# Patient Record
Sex: MALE | Race: WHITE | NOT HISPANIC OR LATINO | URBAN - METROPOLITAN AREA
[De-identification: names, ages, dates, MRNs, and addresses within clinical notes are randomized per-mention and may not be internally consistent; named-entity substitution may affect disease eponyms.]

---

## 2023-07-18 ENCOUNTER — TELEPHONE (OUTPATIENT)
Dept: PRIMARY CARE | Facility: CLINIC | Age: 43
End: 2023-07-18
Payer: COMMERCIAL

## 2023-07-18 DIAGNOSIS — E78.5 DYSLIPIDEMIA: ICD-10-CM

## 2023-07-18 DIAGNOSIS — I10 HYPERTENSION, UNSPECIFIED TYPE: ICD-10-CM

## 2023-07-18 RX ORDER — VALSARTAN 40 MG/1
40 TABLET ORAL DAILY
Qty: 60 TABLET | Refills: 0 | Status: SHIPPED | OUTPATIENT
Start: 2023-07-18 | End: 2023-09-08 | Stop reason: SDUPTHER

## 2023-07-18 RX ORDER — VALSARTAN 40 MG/1
40 TABLET ORAL DAILY
COMMUNITY
End: 2023-07-18 | Stop reason: SDUPTHER

## 2023-07-18 RX ORDER — ATORVASTATIN CALCIUM 20 MG/1
20 TABLET, FILM COATED ORAL NIGHTLY
Qty: 60 TABLET | Refills: 0 | Status: SHIPPED | OUTPATIENT
Start: 2023-07-18 | End: 2023-09-08 | Stop reason: SDUPTHER

## 2023-07-18 RX ORDER — ATORVASTATIN CALCIUM 20 MG/1
20 TABLET, FILM COATED ORAL NIGHTLY
COMMUNITY
End: 2023-07-18 | Stop reason: SDUPTHER

## 2023-07-18 NOTE — TELEPHONE ENCOUNTER
Pt called in and stated that he would like to schedule with Francy and also need a RF because he is all out of medication. PT has an appt scheduled for Sept          RX: Valsartan 40 mg        RX: Atorvastatin calcium 20 mg              [facility]

## 2023-09-07 PROBLEM — J45.909 ASTHMA (HHS-HCC): Status: ACTIVE | Noted: 2023-09-07

## 2023-09-07 PROBLEM — J06.9 VIRAL UPPER RESPIRATORY INFECTION: Status: ACTIVE | Noted: 2023-09-07

## 2023-09-07 PROBLEM — J45.21 MILD INTERMITTENT ASTHMA WITH ACUTE EXACERBATION (HHS-HCC): Status: ACTIVE | Noted: 2023-09-07

## 2023-09-07 PROBLEM — N20.0 NEPHROLITHIASIS: Status: ACTIVE | Noted: 2023-09-07

## 2023-09-07 PROBLEM — R42 DIZZINESS: Status: ACTIVE | Noted: 2023-09-07

## 2023-09-07 PROBLEM — I10 HYPERTENSION: Status: ACTIVE | Noted: 2023-09-07

## 2023-09-07 PROBLEM — F41.9 ANXIETY: Status: ACTIVE | Noted: 2023-09-07

## 2023-09-07 PROBLEM — E78.5 DYSLIPIDEMIA: Status: ACTIVE | Noted: 2023-09-07

## 2023-09-07 PROBLEM — F41.8 DEPRESSION WITH ANXIETY: Status: ACTIVE | Noted: 2023-09-07

## 2023-09-07 PROBLEM — R07.89 ATYPICAL CHEST PAIN: Status: ACTIVE | Noted: 2023-09-07

## 2023-09-07 PROBLEM — M79.604 RIGHT LEG PAIN: Status: ACTIVE | Noted: 2023-09-07

## 2023-09-07 PROBLEM — E66.9 OBESITY: Status: ACTIVE | Noted: 2023-09-07

## 2023-09-07 PROBLEM — M51.369 LUMBAR DEGENERATIVE DISC DISEASE: Status: ACTIVE | Noted: 2023-09-07

## 2023-09-07 PROBLEM — F33.41 RECURRENT MAJOR DEPRESSIVE DISORDER, IN PARTIAL REMISSION (CMS-HCC): Status: ACTIVE | Noted: 2023-09-07

## 2023-09-07 PROBLEM — M54.31 SCIATICA OF RIGHT SIDE: Status: ACTIVE | Noted: 2023-09-07

## 2023-09-07 PROBLEM — M51.36 LUMBAR DEGENERATIVE DISC DISEASE: Status: ACTIVE | Noted: 2023-09-07

## 2023-09-07 PROBLEM — R50.9 FEVER: Status: ACTIVE | Noted: 2023-09-07

## 2023-09-07 PROBLEM — M25.551 ACUTE PAIN OF RIGHT HIP: Status: ACTIVE | Noted: 2023-09-07

## 2023-09-07 PROBLEM — J20.9 ACUTE BRONCHITIS WITH BRONCHOSPASM: Status: ACTIVE | Noted: 2023-09-07

## 2023-09-07 PROBLEM — K21.9 ESOPHAGEAL REFLUX: Status: ACTIVE | Noted: 2023-09-07

## 2023-09-07 PROBLEM — E86.0 DEHYDRATION: Status: ACTIVE | Noted: 2023-09-07

## 2023-09-07 PROBLEM — R53.83 FATIGUE: Status: ACTIVE | Noted: 2023-09-07

## 2023-09-07 PROBLEM — M54.50 LOW BACK PAIN: Status: ACTIVE | Noted: 2023-09-07

## 2023-09-07 PROBLEM — L40.9 PSORIASIS: Status: ACTIVE | Noted: 2023-09-07

## 2023-09-07 RX ORDER — ALBUTEROL SULFATE 90 UG/1
2 AEROSOL, METERED RESPIRATORY (INHALATION) EVERY 4 HOURS PRN
COMMUNITY
Start: 2020-07-20 | End: 2023-09-08 | Stop reason: SDUPTHER

## 2023-09-07 RX ORDER — CHLORHEXIDINE GLUCONATE ORAL RINSE 1.2 MG/ML
SOLUTION DENTAL
COMMUNITY
Start: 2023-04-03 | End: 2023-09-08

## 2023-09-07 RX ORDER — AMOXICILLIN 500 MG/1
CAPSULE ORAL
COMMUNITY
Start: 2023-03-31 | End: 2023-09-08

## 2023-09-07 RX ORDER — METHYLPREDNISOLONE 4 MG/1
TABLET ORAL
COMMUNITY
Start: 2023-03-31 | End: 2023-09-08

## 2023-09-07 RX ORDER — ACETAMINOPHEN AND CODEINE PHOSPHATE 300; 30 MG/1; MG/1
TABLET ORAL
COMMUNITY
Start: 2023-03-09 | End: 2023-09-08

## 2023-09-07 RX ORDER — ESCITALOPRAM OXALATE 5 MG/1
5 TABLET ORAL DAILY
COMMUNITY
End: 2023-09-08

## 2023-09-07 RX ORDER — OXYCODONE HYDROCHLORIDE 5 MG/1
CAPSULE ORAL
COMMUNITY
Start: 2023-03-20 | End: 2023-09-08

## 2023-09-07 RX ORDER — IBUPROFEN 600 MG/1
600 TABLET ORAL EVERY 6 HOURS PRN
COMMUNITY
Start: 2023-03-20 | End: 2023-09-08

## 2023-09-07 RX ORDER — PREDNISONE 10 MG/1
TABLET ORAL
COMMUNITY
Start: 2022-06-24 | End: 2023-09-08

## 2023-09-08 ENCOUNTER — OFFICE VISIT (OUTPATIENT)
Dept: PRIMARY CARE | Facility: CLINIC | Age: 43
End: 2023-09-08
Payer: COMMERCIAL

## 2023-09-08 VITALS
OXYGEN SATURATION: 99 % | HEIGHT: 67 IN | BODY MASS INDEX: 33.9 KG/M2 | DIASTOLIC BLOOD PRESSURE: 80 MMHG | HEART RATE: 99 BPM | WEIGHT: 216 LBS | SYSTOLIC BLOOD PRESSURE: 124 MMHG | RESPIRATION RATE: 18 BRPM

## 2023-09-08 DIAGNOSIS — F41.8 DEPRESSION WITH ANXIETY: Primary | ICD-10-CM

## 2023-09-08 DIAGNOSIS — E78.5 DYSLIPIDEMIA: ICD-10-CM

## 2023-09-08 DIAGNOSIS — I10 HYPERTENSION, UNSPECIFIED TYPE: ICD-10-CM

## 2023-09-08 DIAGNOSIS — E66.09 CLASS 1 OBESITY DUE TO EXCESS CALORIES WITH BODY MASS INDEX (BMI) OF 33.0 TO 33.9 IN ADULT, UNSPECIFIED WHETHER SERIOUS COMORBIDITY PRESENT: ICD-10-CM

## 2023-09-08 DIAGNOSIS — J45.909 UNCOMPLICATED ASTHMA, UNSPECIFIED ASTHMA SEVERITY, UNSPECIFIED WHETHER PERSISTENT (HHS-HCC): ICD-10-CM

## 2023-09-08 DIAGNOSIS — I10 PRIMARY HYPERTENSION: ICD-10-CM

## 2023-09-08 PROCEDURE — 3074F SYST BP LT 130 MM HG: CPT

## 2023-09-08 PROCEDURE — 1036F TOBACCO NON-USER: CPT

## 2023-09-08 PROCEDURE — 3079F DIAST BP 80-89 MM HG: CPT

## 2023-09-08 PROCEDURE — 99214 OFFICE O/P EST MOD 30 MIN: CPT

## 2023-09-08 PROCEDURE — 3008F BODY MASS INDEX DOCD: CPT

## 2023-09-08 RX ORDER — DICLOFENAC SODIUM 10 MG/G
4 GEL TOPICAL 4 TIMES DAILY
COMMUNITY
Start: 2023-07-18

## 2023-09-08 RX ORDER — ATORVASTATIN CALCIUM 20 MG/1
20 TABLET, FILM COATED ORAL NIGHTLY
Qty: 90 TABLET | Refills: 3 | Status: SHIPPED | OUTPATIENT
Start: 2023-09-08 | End: 2023-11-15 | Stop reason: SDUPTHER

## 2023-09-08 RX ORDER — VALSARTAN 40 MG/1
40 TABLET ORAL DAILY
Qty: 90 TABLET | Refills: 3 | Status: SHIPPED | OUTPATIENT
Start: 2023-09-08

## 2023-09-08 RX ORDER — ALBUTEROL SULFATE 90 UG/1
2 AEROSOL, METERED RESPIRATORY (INHALATION) EVERY 4 HOURS PRN
Qty: 18 G | Refills: 3 | Status: SHIPPED | OUTPATIENT
Start: 2023-09-08

## 2023-09-08 ASSESSMENT — ENCOUNTER SYMPTOMS
UNEXPECTED WEIGHT CHANGE: 0
VOICE CHANGE: 0
DIAPHORESIS: 0
STRIDOR: 0
HYPERACTIVE: 0
SLEEP DISTURBANCE: 0
FATIGUE: 0
ARTHRALGIAS: 1
MYALGIAS: 0
COUGH: 0
CONFUSION: 0
BRUISES/BLEEDS EASILY: 0
SEIZURES: 0
FREQUENCY: 0
LOSS OF SENSATION IN FEET: 0
CARDIOVASCULAR NEGATIVE: 1
BLOOD IN STOOL: 0
AGITATION: 0
ANAL BLEEDING: 0
DIARRHEA: 0
HEMATURIA: 0
BACK PAIN: 1
PHOTOPHOBIA: 0
TROUBLE SWALLOWING: 0
PALPITATIONS: 0
NEUROLOGICAL NEGATIVE: 1
POLYPHAGIA: 0
TREMORS: 0
APPETITE CHANGE: 0
HEADACHES: 0
NECK STIFFNESS: 0
DYSPHORIC MOOD: 0
LIGHT-HEADEDNESS: 0
CONSTITUTIONAL NEGATIVE: 1
CHEST TIGHTNESS: 0
ACTIVITY CHANGE: 0
NECK PAIN: 0
EYES NEGATIVE: 1
ENDOCRINE NEGATIVE: 1
POLYDIPSIA: 0
OCCASIONAL FEELINGS OF UNSTEADINESS: 0
RHINORRHEA: 0
NUMBNESS: 0
WEAKNESS: 0
SPEECH DIFFICULTY: 0
APNEA: 0
SINUS PRESSURE: 0
DEPRESSION: 0
EYE DISCHARGE: 0
COLOR CHANGE: 0
JOINT SWELLING: 0
ABDOMINAL PAIN: 0
ABDOMINAL DISTENTION: 0
RESPIRATORY NEGATIVE: 1
RECTAL PAIN: 0
DYSURIA: 0
WHEEZING: 0
SORE THROAT: 0
SHORTNESS OF BREATH: 0
DIZZINESS: 0
NERVOUS/ANXIOUS: 0
GASTROINTESTINAL NEGATIVE: 1
CHILLS: 0
PSYCHIATRIC NEGATIVE: 1
DIFFICULTY URINATING: 0
FEVER: 0
CONSTIPATION: 0
NAUSEA: 0
HEMATOLOGIC/LYMPHATIC NEGATIVE: 1
FLANK PAIN: 0

## 2023-09-08 ASSESSMENT — PATIENT HEALTH QUESTIONNAIRE - PHQ9
SUM OF ALL RESPONSES TO PHQ9 QUESTIONS 1 AND 2: 0
2. FEELING DOWN, DEPRESSED OR HOPELESS: NOT AT ALL
1. LITTLE INTEREST OR PLEASURE IN DOING THINGS: NOT AT ALL

## 2023-09-08 NOTE — PROGRESS NOTES
Primary Care Provider: JESUS Posada-CNP    Subjective   Joselito Stanley is a 43 y.o. male who presents for [facility] pt.    Former patient of Dr. Bynum-- here today to establish care and medication refills    PMH of HTN, depression, anxiety, dyslipidemia, chronic low back pain.    BP is controlled. Needs refills on his meds.     Asthma     Anxiety/ depression- no symptoms, was previously related to COVID pandemic    chronic low back pain- went to the ER; was ruled out kidney stones; back pain much better    In school for [employer]; works second shift at [employer]    Sxs & conditions moderate in severity, stable controlled over the past month.          Review of Systems   Constitutional: Negative.  Negative for activity change, appetite change, chills, diaphoresis, fatigue, fever and unexpected weight change.   HENT: Negative.  Negative for congestion, dental problem, ear discharge, ear pain, hearing loss, mouth sores, nosebleeds, postnasal drip, rhinorrhea, sinus pressure, sneezing, sore throat, tinnitus, trouble swallowing and voice change.    Eyes: Negative.  Negative for photophobia, discharge and visual disturbance.   Respiratory: Negative.  Negative for apnea, cough, chest tightness, shortness of breath, wheezing and stridor.    Cardiovascular: Negative.  Negative for chest pain, palpitations and leg swelling.   Gastrointestinal: Negative.  Negative for abdominal distention, abdominal pain, anal bleeding, blood in stool, constipation, diarrhea, nausea and rectal pain.   Endocrine: Negative.  Negative for cold intolerance, heat intolerance, polydipsia, polyphagia and polyuria.   Genitourinary: Negative.  Negative for decreased urine volume, difficulty urinating, dysuria, flank pain, frequency, hematuria and urgency.   Musculoskeletal:  Positive for arthralgias and back pain. Negative for gait problem, joint swelling, myalgias, neck pain and neck stiffness.   Skin: Negative.  Negative for color change and rash.  "  Neurological: Negative.  Negative for dizziness, tremors, seizures, syncope, speech difficulty, weakness, light-headedness, numbness and headaches.   Hematological: Negative.  Does not bruise/bleed easily.   Psychiatric/Behavioral: Negative.  Negative for agitation, confusion, dysphoric mood, sleep disturbance and suicidal ideas. The patient is not nervous/anxious and is not hyperactive.    All other systems reviewed and are negative.        Objective   /80   Pulse 99   Resp 18   Ht 1.702 m (5' 7\")   Wt 98 kg (216 lb)   SpO2 99%   BMI 33.83 kg/m²     Physical Exam  Vitals reviewed.   Constitutional:       General: He is not in acute distress.     Appearance: Normal appearance. He is normal weight. He is not ill-appearing, toxic-appearing or diaphoretic.   HENT:      Head: Normocephalic and atraumatic.      Nose: Nose normal.   Eyes:      Extraocular Movements: Extraocular movements intact.      Conjunctiva/sclera: Conjunctivae normal.      Pupils: Pupils are equal, round, and reactive to light.   Cardiovascular:      Rate and Rhythm: Normal rate and regular rhythm.      Pulses: Normal pulses.      Heart sounds: Normal heart sounds. No murmur heard.     No friction rub. No gallop.   Pulmonary:      Effort: Pulmonary effort is normal. No respiratory distress.      Breath sounds: Normal breath sounds.   Abdominal:      General: Abdomen is flat. Bowel sounds are normal.      Palpations: Abdomen is soft.   Musculoskeletal:         General: Normal range of motion.      Cervical back: Normal range of motion and neck supple.   Skin:     General: Skin is warm and dry.      Capillary Refill: Capillary refill takes less than 2 seconds.   Neurological:      General: No focal deficit present.      Mental Status: He is alert and oriented to person, place, and time. Mental status is at baseline.   Psychiatric:         Mood and Affect: Mood normal.         Behavior: Behavior normal.         Thought Content: Thought " content normal.         Judgment: Judgment normal.         Assessment/Plan   Problem List Items Addressed This Visit       Asthma    Relevant Medications    albuterol 90 mcg/actuation inhaler    Depression with anxiety - Primary  Stable, not on any medication    Dyslipidemia    Relevant Medications    atorvastatin (Lipitor) 20 mg tablet    Other Relevant Orders    Lipid Panel    Hypertension    Relevant Medications    valsartan (Diovan) 40 mg tablet    Obesity  Work on healthier eating and increase physical activity         Follow up in 1 year or sooner if needed

## 2023-11-15 ENCOUNTER — TELEPHONE (OUTPATIENT)
Dept: PRIMARY CARE | Facility: CLINIC | Age: 43
End: 2023-11-15
Payer: COMMERCIAL

## 2023-11-15 DIAGNOSIS — E78.5 DYSLIPIDEMIA: ICD-10-CM

## 2023-11-15 NOTE — TELEPHONE ENCOUNTER
PT called in and stated that the pharmacy told him he has no RF on           RX: Atorvastatin 20 mg            SUZETTE MILNER

## 2023-11-17 RX ORDER — ATORVASTATIN CALCIUM 20 MG/1
20 TABLET, FILM COATED ORAL NIGHTLY
Qty: 90 TABLET | Refills: 3 | Status: SHIPPED | OUTPATIENT
Start: 2023-11-17

## 2024-04-29 ENCOUNTER — HOSPITAL ENCOUNTER (OUTPATIENT)
Dept: RADIOLOGY | Facility: CLINIC | Age: 44
Discharge: HOME | End: 2024-04-29
Payer: COMMERCIAL

## 2024-04-29 DIAGNOSIS — R07.9 CHEST PAIN, UNSPECIFIED TYPE: ICD-10-CM

## 2024-04-29 PROCEDURE — 71046 X-RAY EXAM CHEST 2 VIEWS: CPT

## 2024-04-29 PROCEDURE — 71046 X-RAY EXAM CHEST 2 VIEWS: CPT | Mod: FOREIGN READ | Performed by: RADIOLOGY

## 2024-09-06 DIAGNOSIS — I10 HYPERTENSION, UNSPECIFIED TYPE: ICD-10-CM

## 2024-09-06 RX ORDER — VALSARTAN 40 MG/1
40 TABLET ORAL DAILY
Qty: 30 TABLET | Refills: 0 | Status: SHIPPED | OUTPATIENT
Start: 2024-09-06

## 2024-09-06 NOTE — TELEPHONE ENCOUNTER
Needs apt for refills. Last seen by my 9/2023. Is already schedule apt 9/10/24. 30 day supply given.

## 2024-09-10 ENCOUNTER — APPOINTMENT (OUTPATIENT)
Dept: PRIMARY CARE | Facility: CLINIC | Age: 44
End: 2024-09-10
Payer: COMMERCIAL

## 2024-09-10 ENCOUNTER — LAB (OUTPATIENT)
Dept: LAB | Facility: LAB | Age: 44
End: 2024-09-10
Payer: COMMERCIAL

## 2024-09-10 VITALS
HEART RATE: 88 BPM | DIASTOLIC BLOOD PRESSURE: 76 MMHG | WEIGHT: 227 LBS | BODY MASS INDEX: 35.63 KG/M2 | SYSTOLIC BLOOD PRESSURE: 118 MMHG | HEIGHT: 67 IN

## 2024-09-10 DIAGNOSIS — J45.909 UNCOMPLICATED ASTHMA, UNSPECIFIED ASTHMA SEVERITY, UNSPECIFIED WHETHER PERSISTENT (HHS-HCC): ICD-10-CM

## 2024-09-10 DIAGNOSIS — R07.89 ATYPICAL CHEST PAIN: ICD-10-CM

## 2024-09-10 DIAGNOSIS — E66.09 CLASS 2 OBESITY DUE TO EXCESS CALORIES WITH BODY MASS INDEX (BMI) OF 35.0 TO 35.9 IN ADULT, UNSPECIFIED WHETHER SERIOUS COMORBIDITY PRESENT: ICD-10-CM

## 2024-09-10 DIAGNOSIS — Z12.11 ENCOUNTER FOR COLORECTAL CANCER SCREENING: ICD-10-CM

## 2024-09-10 DIAGNOSIS — I10 PRIMARY HYPERTENSION: ICD-10-CM

## 2024-09-10 DIAGNOSIS — Z00.00 HEALTH CARE MAINTENANCE: ICD-10-CM

## 2024-09-10 DIAGNOSIS — Z12.5 PROSTATE CANCER SCREENING: ICD-10-CM

## 2024-09-10 DIAGNOSIS — F33.41 RECURRENT MAJOR DEPRESSIVE DISORDER, IN PARTIAL REMISSION (CMS-HCC): ICD-10-CM

## 2024-09-10 DIAGNOSIS — K21.9 GASTROESOPHAGEAL REFLUX DISEASE WITHOUT ESOPHAGITIS: ICD-10-CM

## 2024-09-10 DIAGNOSIS — Z12.12 ENCOUNTER FOR COLORECTAL CANCER SCREENING: ICD-10-CM

## 2024-09-10 DIAGNOSIS — E78.5 DYSLIPIDEMIA: ICD-10-CM

## 2024-09-10 DIAGNOSIS — Z00.00 HEALTH CARE MAINTENANCE: Primary | ICD-10-CM

## 2024-09-10 PROBLEM — J06.9 VIRAL UPPER RESPIRATORY INFECTION: Status: RESOLVED | Noted: 2023-09-07 | Resolved: 2024-09-10

## 2024-09-10 PROBLEM — E86.0 DEHYDRATION: Status: RESOLVED | Noted: 2023-09-07 | Resolved: 2024-09-10

## 2024-09-10 LAB
ALBUMIN SERPL BCP-MCNC: 4.4 G/DL (ref 3.4–5)
ALP SERPL-CCNC: 61 U/L (ref 33–120)
ALT SERPL W P-5'-P-CCNC: 52 U/L (ref 10–52)
ANION GAP SERPL CALC-SCNC: 12 MMOL/L (ref 10–20)
APPEARANCE UR: CLEAR
AST SERPL W P-5'-P-CCNC: 21 U/L (ref 9–39)
BASOPHILS # BLD AUTO: 0.03 X10*3/UL (ref 0–0.1)
BASOPHILS NFR BLD AUTO: 0.6 %
BILIRUB SERPL-MCNC: 0.7 MG/DL (ref 0–1.2)
BILIRUB UR STRIP.AUTO-MCNC: NEGATIVE MG/DL
BUN SERPL-MCNC: 8 MG/DL (ref 6–23)
CALCIUM SERPL-MCNC: 8.9 MG/DL (ref 8.6–10.3)
CHLORIDE SERPL-SCNC: 104 MMOL/L (ref 98–107)
CHOLEST SERPL-MCNC: 164 MG/DL (ref 0–199)
CHOLESTEROL/HDL RATIO: 5
CO2 SERPL-SCNC: 26 MMOL/L (ref 21–32)
COLOR UR: NORMAL
CREAT SERPL-MCNC: 0.93 MG/DL (ref 0.5–1.3)
EGFRCR SERPLBLD CKD-EPI 2021: >90 ML/MIN/1.73M*2
EOSINOPHIL # BLD AUTO: 0.11 X10*3/UL (ref 0–0.7)
EOSINOPHIL NFR BLD AUTO: 2 %
ERYTHROCYTE [DISTWIDTH] IN BLOOD BY AUTOMATED COUNT: 12.6 % (ref 11.5–14.5)
GLUCOSE SERPL-MCNC: 93 MG/DL (ref 74–99)
GLUCOSE UR STRIP.AUTO-MCNC: NORMAL MG/DL
HCT VFR BLD AUTO: 44.7 % (ref 41–52)
HDLC SERPL-MCNC: 32.5 MG/DL
HGB BLD-MCNC: 15 G/DL (ref 13.5–17.5)
IMM GRANULOCYTES # BLD AUTO: 0.03 X10*3/UL (ref 0–0.7)
IMM GRANULOCYTES NFR BLD AUTO: 0.6 % (ref 0–0.9)
KETONES UR STRIP.AUTO-MCNC: NEGATIVE MG/DL
LDLC SERPL CALC-MCNC: 99 MG/DL
LEUKOCYTE ESTERASE UR QL STRIP.AUTO: NEGATIVE
LYMPHOCYTES # BLD AUTO: 1.55 X10*3/UL (ref 1.2–4.8)
LYMPHOCYTES NFR BLD AUTO: 28.7 %
MCH RBC QN AUTO: 27.9 PG (ref 26–34)
MCHC RBC AUTO-ENTMCNC: 33.6 G/DL (ref 32–36)
MCV RBC AUTO: 83 FL (ref 80–100)
MONOCYTES # BLD AUTO: 0.38 X10*3/UL (ref 0.1–1)
MONOCYTES NFR BLD AUTO: 7 %
NEUTROPHILS # BLD AUTO: 3.31 X10*3/UL (ref 1.2–7.7)
NEUTROPHILS NFR BLD AUTO: 61.1 %
NITRITE UR QL STRIP.AUTO: NEGATIVE
NON HDL CHOLESTEROL: 132 MG/DL (ref 0–149)
NRBC BLD-RTO: 0 /100 WBCS (ref 0–0)
PH UR STRIP.AUTO: 7 [PH]
PLATELET # BLD AUTO: 279 X10*3/UL (ref 150–450)
POTASSIUM SERPL-SCNC: 4.1 MMOL/L (ref 3.5–5.3)
PROT SERPL-MCNC: 7.1 G/DL (ref 6.4–8.2)
PROT UR STRIP.AUTO-MCNC: NEGATIVE MG/DL
RBC # BLD AUTO: 5.38 X10*6/UL (ref 4.5–5.9)
RBC # UR STRIP.AUTO: NEGATIVE /UL
SODIUM SERPL-SCNC: 138 MMOL/L (ref 136–145)
SP GR UR STRIP.AUTO: 1.02
TRIGL SERPL-MCNC: 164 MG/DL (ref 0–149)
UROBILINOGEN UR STRIP.AUTO-MCNC: NORMAL MG/DL
VLDL: 33 MG/DL (ref 0–40)
WBC # BLD AUTO: 5.4 X10*3/UL (ref 4.4–11.3)

## 2024-09-10 PROCEDURE — 83036 HEMOGLOBIN GLYCOSYLATED A1C: CPT

## 2024-09-10 PROCEDURE — 80061 LIPID PANEL: CPT

## 2024-09-10 PROCEDURE — 3078F DIAST BP <80 MM HG: CPT

## 2024-09-10 PROCEDURE — 3074F SYST BP LT 130 MM HG: CPT

## 2024-09-10 PROCEDURE — 80053 COMPREHEN METABOLIC PANEL: CPT

## 2024-09-10 PROCEDURE — 3008F BODY MASS INDEX DOCD: CPT

## 2024-09-10 PROCEDURE — 81003 URINALYSIS AUTO W/O SCOPE: CPT

## 2024-09-10 PROCEDURE — 85025 COMPLETE CBC W/AUTO DIFF WBC: CPT

## 2024-09-10 PROCEDURE — 1036F TOBACCO NON-USER: CPT

## 2024-09-10 PROCEDURE — 36415 COLL VENOUS BLD VENIPUNCTURE: CPT

## 2024-09-10 PROCEDURE — 93000 ELECTROCARDIOGRAM COMPLETE: CPT

## 2024-09-10 PROCEDURE — 99396 PREV VISIT EST AGE 40-64: CPT

## 2024-09-10 PROCEDURE — 84153 ASSAY OF PSA TOTAL: CPT

## 2024-09-10 RX ORDER — ALBUTEROL SULFATE 90 UG/1
2 INHALANT RESPIRATORY (INHALATION) EVERY 4 HOURS PRN
Qty: 18 G | Refills: 3 | Status: SHIPPED | OUTPATIENT
Start: 2024-09-10

## 2024-09-10 RX ORDER — VALSARTAN 40 MG/1
40 TABLET ORAL DAILY
Qty: 90 TABLET | Refills: 3 | Status: SHIPPED | OUTPATIENT
Start: 2024-09-10

## 2024-09-10 RX ORDER — ATORVASTATIN CALCIUM 20 MG/1
20 TABLET, FILM COATED ORAL NIGHTLY
Qty: 90 TABLET | Refills: 3 | Status: SHIPPED | OUTPATIENT
Start: 2024-09-10

## 2024-09-10 ASSESSMENT — ENCOUNTER SYMPTOMS
BRUISES/BLEEDS EASILY: 0
BLOOD IN STOOL: 0
POLYPHAGIA: 0
BACK PAIN: 0
NUMBNESS: 0
RECTAL PAIN: 0
NECK STIFFNESS: 0
HEMATURIA: 0
LIGHT-HEADEDNESS: 0
DIAPHORESIS: 0
STRIDOR: 0
HEADACHES: 0
ABDOMINAL DISTENTION: 0
RESPIRATORY NEGATIVE: 1
FEVER: 0
OCCASIONAL FEELINGS OF UNSTEADINESS: 0
TROUBLE SWALLOWING: 0
FREQUENCY: 0
GASTROINTESTINAL NEGATIVE: 1
HYPERACTIVE: 0
DIFFICULTY URINATING: 0
HEMATOLOGIC/LYMPHATIC NEGATIVE: 1
APPETITE CHANGE: 0
POLYDIPSIA: 0
DIZZINESS: 0
MYALGIAS: 0
COLOR CHANGE: 0
NEUROLOGICAL NEGATIVE: 1
TREMORS: 0
EYE DISCHARGE: 0
ABDOMINAL PAIN: 0
MUSCULOSKELETAL NEGATIVE: 1
PALPITATIONS: 0
CONSTITUTIONAL NEGATIVE: 1
AGITATION: 0
WHEEZING: 0
SORE THROAT: 0
NECK PAIN: 0
SHORTNESS OF BREATH: 0
FATIGUE: 0
PHOTOPHOBIA: 0
PSYCHIATRIC NEGATIVE: 1
LOSS OF SENSATION IN FEET: 0
DEPRESSION: 0
DYSPHORIC MOOD: 0
NAUSEA: 0
NERVOUS/ANXIOUS: 0
UNEXPECTED WEIGHT CHANGE: 0
SPEECH DIFFICULTY: 0
CONFUSION: 0
SLEEP DISTURBANCE: 0
DYSURIA: 0
DIARRHEA: 0
JOINT SWELLING: 0
COUGH: 0
VOICE CHANGE: 0
CHILLS: 0
EYES NEGATIVE: 1
CONSTIPATION: 0
WEAKNESS: 0
FLANK PAIN: 0
ANAL BLEEDING: 0
CHOKING: 0
ENDOCRINE NEGATIVE: 1
RHINORRHEA: 0
SEIZURES: 0
ACTIVITY CHANGE: 0
APNEA: 0
SINUS PRESSURE: 0
CHEST TIGHTNESS: 0

## 2024-09-10 ASSESSMENT — PATIENT HEALTH QUESTIONNAIRE - PHQ9
1. LITTLE INTEREST OR PLEASURE IN DOING THINGS: NOT AT ALL
SUM OF ALL RESPONSES TO PHQ9 QUESTIONS 1 AND 2: 0
2. FEELING DOWN, DEPRESSED OR HOPELESS: NOT AT ALL

## 2024-09-10 NOTE — PROGRESS NOTES
"Reason for Visit: Annual Physical Exam    HPI:  HPI    Health Maintenance    Needs medications RF    HTN- stable    HLD    Obesity    Reflux    Depression- no symptoms, in remission was previously related to COVID pandemic     Chronic back pain    Psoriasis    Asthma- hardly uses inhaler    Most recent lipid panel:   Lab Results   Component Value Date    CHOL 173 2020     Lab Results   Component Value Date    HDL 36.8 (A) 2020     No results found for: \"LDLCALC\"  Lab Results   Component Value Date    TRIG 64 2020     No components found for: \"CHOLHDL\"  Hgb A1c:   Lab Results   Component Value Date    HGBA1C 5.1 2020     PSA: No results found for: \"PSA\"  Testicular examination: monthly, self  Colonoscopy: due at 47 unless otherwise indicated  Depression PHQ-2: 0  Exercise: intermittently   Diet: well-balanced  Immunizations: UTD; gets from [facility]  Dental Examinations: q6mo    Went to urgent care 24 with atypical chest pain and completed CXR without any acute process.  Denies any chest pain, but endorses sternum pain; does not hurt when he presses on it, does not occur with activity  no SOB, BM regular, no trouble urinating, energy level is good, no dizziness, no lightheadedness, no racing HR, no palpitations    Maternal grandfather was diagnosed with colon cancer before age 53 (age of diagnosis unknown), decreased at age 53 from colon cancer; mother  at age 60 from alcohol use and was a heavy smoker; unknown if any colon polyps. He does not know if either of his parents had any tubular adenomas removed.    Active Problem List  Patient Active Problem List   Diagnosis    Acute bronchitis with bronchospasm    Anxiety    Asthma (HHS-HCC)    Atypical chest pain    Depression with anxiety    Dizziness    Esophageal reflux    Dyslipidemia    Fatigue    Fever    Hypertension    Low back pain    Lumbar degenerative disc disease    Nephrolithiasis    Obesity    Psoriasis    Recurrent major " depressive disorder, in partial remission (CMS-HCC)    Acute pain of right hip    Right leg pain    Sciatica of right side    Prostate cancer screening       Comprehensive Medical/Surgical/Social/Family History  Past Medical History:   Diagnosis Date    Dehydration 09/07/2023    Personal history of other diseases of the circulatory system     History of hypertension    Personal history of other diseases of the musculoskeletal system and connective tissue 07/21/2020    History of low back pain    Personal history of other endocrine, nutritional and metabolic disease     History of hyperlipidemia    Viral upper respiratory infection 09/07/2023     History reviewed. No pertinent surgical history.  Social History     Social History Narrative    Not on file         Allergies and Medications  Patient has no known allergies.  Current Outpatient Medications on File Prior to Visit   Medication Sig Dispense Refill    diclofenac sodium (Voltaren) 1 % gel gel Apply 1 Application topically 4 times a day.      [DISCONTINUED] albuterol 90 mcg/actuation inhaler Inhale 2 puffs every 4 hours if needed for wheezing. 18 g 3    [DISCONTINUED] atorvastatin (Lipitor) 20 mg tablet Take 1 tablet (20 mg) by mouth once daily at bedtime. 90 tablet 3    [DISCONTINUED] valsartan (Diovan) 40 mg tablet Take 1 tablet (40 mg) by mouth once daily. 90 tablet 3    [DISCONTINUED] valsartan (Diovan) 40 mg tablet TAKE ONE TABLET BY MOUTH EVERY DAY 30 tablet 0     No current facility-administered medications on file prior to visit.         ROS otherwise negative aside from what was mentioned above in HPI.  Review of Systems   Constitutional: Negative.  Negative for activity change, appetite change, chills, diaphoresis, fatigue, fever and unexpected weight change.   HENT: Negative.  Negative for congestion, dental problem, ear discharge, ear pain, hearing loss, mouth sores, nosebleeds, postnasal drip, rhinorrhea, sinus pressure, sneezing, sore throat,  "tinnitus, trouble swallowing and voice change.    Eyes: Negative.  Negative for photophobia, discharge and visual disturbance.   Respiratory: Negative.  Negative for apnea, cough, choking, chest tightness, shortness of breath, wheezing and stridor.    Cardiovascular:  Negative for palpitations and leg swelling.   Gastrointestinal: Negative.  Negative for abdominal distention, abdominal pain, anal bleeding, blood in stool, constipation, diarrhea, nausea and rectal pain.   Endocrine: Negative.  Negative for cold intolerance, heat intolerance, polydipsia, polyphagia and polyuria.   Genitourinary: Negative.  Negative for decreased urine volume, difficulty urinating, dysuria, flank pain, frequency, hematuria and urgency.   Musculoskeletal: Negative.  Negative for back pain, gait problem, joint swelling, myalgias, neck pain and neck stiffness.   Skin: Negative.  Negative for color change and rash.   Neurological: Negative.  Negative for dizziness, tremors, seizures, syncope, speech difficulty, weakness, light-headedness, numbness and headaches.   Hematological: Negative.  Does not bruise/bleed easily.   Psychiatric/Behavioral: Negative.  Negative for agitation, confusion, dysphoric mood, sleep disturbance and suicidal ideas. The patient is not nervous/anxious and is not hyperactive.    All other systems reviewed and are negative.        Vitals  /76   Pulse 88   Ht 1.702 m (5' 7\")   Wt 103 kg (227 lb)   BMI 35.55 kg/m²   Body mass index is 35.55 kg/m².    Physical Exam  Physical Exam  Vitals reviewed.   Constitutional:       General: He is not in acute distress.     Appearance: Normal appearance. He is normal weight. He is not ill-appearing, toxic-appearing or diaphoretic.   HENT:      Head: Normocephalic and atraumatic.      Nose: Nose normal.   Eyes:      Conjunctiva/sclera: Conjunctivae normal.   Cardiovascular:      Rate and Rhythm: Normal rate and regular rhythm.      Pulses: Normal pulses.      Heart " sounds: Normal heart sounds. No murmur heard.     No friction rub. No gallop.   Pulmonary:      Effort: Pulmonary effort is normal. No respiratory distress.      Breath sounds: Normal breath sounds.   Abdominal:      General: Abdomen is flat. Bowel sounds are normal.      Palpations: Abdomen is soft.   Musculoskeletal:         General: Normal range of motion.      Cervical back: Normal range of motion and neck supple.   Lymphadenopathy:      Cervical: No cervical adenopathy.   Skin:     General: Skin is warm and dry.      Capillary Refill: Capillary refill takes less than 2 seconds.   Neurological:      General: No focal deficit present.      Mental Status: He is alert and oriented to person, place, and time. Mental status is at baseline.   Psychiatric:         Mood and Affect: Mood normal.         Behavior: Behavior normal.         Thought Content: Thought content normal.         Judgment: Judgment normal.           Assessment and Plan:  Problem List Items Addressed This Visit    CPE         ICD-10-CM    Asthma ([facility]) J45.909    Stable  Relevant Medications    albuterol 90 mcg/actuation inhaler    Other Relevant Orders    CBC and Auto Differential (Completed)    Comprehensive metabolic panel    Lipid Panel    Hemoglobin A1C    Urinalysis with Reflex Microscopic    Atypical chest pain R07.89    Consider CT chest next if WENDIE is WNL  Relevant Orders    ECG 12 lead (Clinic Performed)-acute findings    Echocardiogram Stress Test    Esophageal reflux K21.9    Stable  Relevant Orders    CBC and Auto Differential (Completed)    Comprehensive metabolic panel    Lipid Panel    Hemoglobin A1C    Urinalysis with Reflex Microscopic    Dyslipidemia E78.5    Stable  Relevant Medications    atorvastatin (Lipitor) 20 mg tablet    Other Relevant Orders    CBC and Auto Differential (Completed)    Comprehensive metabolic panel    Lipid Panel    Hemoglobin A1C    Urinalysis with Reflex Microscopic    Hypertension I10      Relevant  Medications    valsartan (Diovan) 40 mg tablet    Other Relevant Orders    CBC and Auto Differential (Completed)    Comprehensive metabolic panel    Lipid Panel    Hemoglobin A1C    Urinalysis with Reflex Microscopic    Obesity E66.9    Encourage diet and exercise to maintain healthy lifestyle.   Relevant Orders    CBC and Auto Differential (Completed)    Comprehensive metabolic panel    Lipid Panel    Hemoglobin A1C    Urinalysis with Reflex Microscopic    Recurrent major depressive disorder, in  remission (CMS-HCC) F33.41    Stable, in remission  Relevant Orders    CBC and Auto Differential (Completed)    Comprehensive metabolic panel    Lipid Panel    Hemoglobin A1C    Urinalysis with Reflex Microscopic    Prostate cancer screening - Primary Z12.5    Relevant Orders    Prostate Specific Antigen     Other Visit Diagnoses         Codes    Encounter for colorectal cancer screening     Z12.11, Z12.12    Relevant Orders    Colonoscopy Screening; Average Risk Patient              Encourage diet and exercise to maintain healthy lifestyle.     Follow-up in 3 months or sooner if needed    JESUS Posada-CNP

## 2024-09-11 LAB
EST. AVERAGE GLUCOSE BLD GHB EST-MCNC: 114 MG/DL
HBA1C MFR BLD: 5.6 %
PSA SERPL-MCNC: 0.66 NG/ML

## 2025-03-03 ENCOUNTER — HOSPITAL ENCOUNTER (OUTPATIENT)
Dept: CARDIOLOGY | Facility: HOSPITAL | Age: 45
Discharge: HOME | End: 2025-03-03
Payer: COMMERCIAL

## 2025-03-03 DIAGNOSIS — R07.89 ATYPICAL CHEST PAIN: ICD-10-CM

## 2025-03-03 PROCEDURE — 93325 DOPPLER ECHO COLOR FLOW MAPG: CPT

## 2025-03-03 PROCEDURE — 93018 CV STRESS TEST I&R ONLY: CPT | Performed by: INTERNAL MEDICINE

## 2025-03-03 PROCEDURE — 93350 STRESS TTE ONLY: CPT | Performed by: INTERNAL MEDICINE

## 2025-03-03 PROCEDURE — 93325 DOPPLER ECHO COLOR FLOW MAPG: CPT | Performed by: INTERNAL MEDICINE

## 2025-03-03 PROCEDURE — 93016 CV STRESS TEST SUPVJ ONLY: CPT | Performed by: INTERNAL MEDICINE

## 2025-03-11 ENCOUNTER — TELEPHONE (OUTPATIENT)
Dept: PRIMARY CARE | Facility: CLINIC | Age: 45
End: 2025-03-11

## 2025-03-11 ENCOUNTER — PATIENT MESSAGE (OUTPATIENT)
Dept: PRIMARY CARE | Facility: CLINIC | Age: 45
End: 2025-03-11
Payer: COMMERCIAL

## 2025-03-11 NOTE — TELEPHONE ENCOUNTER
PATIENT ASK FOR A CALL BACK FROM TIFFANY [phone] HE WANTED TO MAKE AN APPT ADVISE PT OF NEXT APPT 3/18 AT 9AM PT SAID NO AND ASK FOR A CALL BACK HE IS HAVING PAIN IN THE RIGHT TESTICLE

## 2025-03-14 ENCOUNTER — HOSPITAL ENCOUNTER (OUTPATIENT)
Dept: RADIOLOGY | Facility: HOSPITAL | Age: 45
Discharge: HOME | End: 2025-03-14
Payer: COMMERCIAL

## 2025-03-14 ENCOUNTER — OFFICE VISIT (OUTPATIENT)
Dept: PRIMARY CARE | Facility: CLINIC | Age: 45
End: 2025-03-14
Payer: COMMERCIAL

## 2025-03-14 VITALS
OXYGEN SATURATION: 95 % | DIASTOLIC BLOOD PRESSURE: 89 MMHG | WEIGHT: 225 LBS | HEART RATE: 89 BPM | HEIGHT: 67 IN | BODY MASS INDEX: 35.31 KG/M2 | SYSTOLIC BLOOD PRESSURE: 136 MMHG

## 2025-03-14 DIAGNOSIS — I86.1 RIGHT VARICOCELE: Primary | ICD-10-CM

## 2025-03-14 DIAGNOSIS — N50.82 PAIN IN SCROTUM: Primary | ICD-10-CM

## 2025-03-14 DIAGNOSIS — J45.909 UNCOMPLICATED ASTHMA, UNSPECIFIED ASTHMA SEVERITY, UNSPECIFIED WHETHER PERSISTENT (HHS-HCC): ICD-10-CM

## 2025-03-14 DIAGNOSIS — N50.82 PAIN IN SCROTUM: ICD-10-CM

## 2025-03-14 PROBLEM — R07.89 ATYPICAL CHEST PAIN: Status: RESOLVED | Noted: 2023-09-07 | Resolved: 2025-03-14

## 2025-03-14 PROBLEM — F41.8 DEPRESSION WITH ANXIETY: Status: RESOLVED | Noted: 2023-09-07 | Resolved: 2025-03-14

## 2025-03-14 PROBLEM — F33.41 RECURRENT MAJOR DEPRESSIVE DISORDER, IN PARTIAL REMISSION (CMS-HCC): Status: RESOLVED | Noted: 2023-09-07 | Resolved: 2025-03-14

## 2025-03-14 PROBLEM — F41.9 ANXIETY: Status: RESOLVED | Noted: 2023-09-07 | Resolved: 2025-03-14

## 2025-03-14 PROBLEM — K21.9 GASTROESOPHAGEAL REFLUX DISEASE: Status: RESOLVED | Noted: 2025-03-14 | Resolved: 2025-03-14

## 2025-03-14 PROBLEM — K21.9 ESOPHAGEAL REFLUX: Status: RESOLVED | Noted: 2023-09-07 | Resolved: 2025-03-14

## 2025-03-14 PROBLEM — J45.20 MILD INTERMITTENT ASTHMA: Status: ACTIVE | Noted: 2025-03-14

## 2025-03-14 PROBLEM — J20.9 ACUTE BRONCHITIS WITH BRONCHOSPASM: Status: RESOLVED | Noted: 2023-09-07 | Resolved: 2025-03-14

## 2025-03-14 PROBLEM — R50.9 FEVER: Status: RESOLVED | Noted: 2023-09-07 | Resolved: 2025-03-14

## 2025-03-14 PROBLEM — J06.9 VIRAL UPPER RESPIRATORY TRACT INFECTION: Status: RESOLVED | Noted: 2025-03-14 | Resolved: 2025-03-14

## 2025-03-14 PROBLEM — M25.559 ARTHRALGIA OF HIP: Status: ACTIVE | Noted: 2023-09-07

## 2025-03-14 PROBLEM — R42 DIZZINESS: Status: RESOLVED | Noted: 2023-09-07 | Resolved: 2025-03-14

## 2025-03-14 PROBLEM — F41.8 MIXED ANXIETY DEPRESSIVE DISORDER: Status: RESOLVED | Noted: 2025-03-14 | Resolved: 2025-03-14

## 2025-03-14 PROCEDURE — 76870 US EXAM SCROTUM: CPT

## 2025-03-14 PROCEDURE — 3008F BODY MASS INDEX DOCD: CPT

## 2025-03-14 PROCEDURE — 3075F SYST BP GE 130 - 139MM HG: CPT

## 2025-03-14 PROCEDURE — 1036F TOBACCO NON-USER: CPT

## 2025-03-14 PROCEDURE — 3079F DIAST BP 80-89 MM HG: CPT

## 2025-03-14 PROCEDURE — 99213 OFFICE O/P EST LOW 20 MIN: CPT

## 2025-03-14 ASSESSMENT — ENCOUNTER SYMPTOMS
DIFFICULTY URINATING: 0
DYSURIA: 0
FREQUENCY: 0
HEMATURIA: 0
FLANK PAIN: 0

## 2025-03-14 NOTE — PROGRESS NOTES
"Primary Care Provider: Francy Nesbitt, JESUS-CNP    Subjective   Joselito Stanley is a 45 y.o. male who presents for Follow-up (Pain in right testicle. ).    HPI   PMH of HTN, dyslipidemia, asthma; prior hx of kidney stones & depression/anxiety     Here today with concerns of right sided testicular pain  Pain waxes and wanes   Hurts with movement and sitting; no pain with urination or issues with urination, no fevers, no chills; No frequency, urgency, dysuria, discharge  Some radiation of pain into groin  Going on for 2 weeks now  Hx of vas differens infection  Lab Results   Component Value Date    PSA 0.66 09/10/2024     Asthma- no SOB, no wheezing    Review of Systems   Genitourinary:  Positive for testicular pain. Negative for decreased urine volume, difficulty urinating, dysuria, flank pain, frequency, genital sores, hematuria, penile discharge, penile pain, penile swelling, scrotal swelling and urgency.     The remainder of the ROS was negative unless otherwise stated in the HPI.       Objective   /89   Pulse 89   Ht 1.702 m (5' 7\")   Wt 102 kg (225 lb)   SpO2 95%   BMI 35.24 kg/m²     Physical Exam  Vitals reviewed.   Constitutional:       General: He is not in acute distress.     Appearance: Normal appearance. He is normal weight. He is not ill-appearing, toxic-appearing or diaphoretic.   HENT:      Head: Normocephalic and atraumatic.   Cardiovascular:      Rate and Rhythm: Normal rate and regular rhythm.      Pulses: Normal pulses.      Heart sounds: Normal heart sounds. No murmur heard.     No friction rub. No gallop.   Pulmonary:      Effort: Pulmonary effort is normal. No respiratory distress.      Breath sounds: Normal breath sounds.   Abdominal:      General: Abdomen is flat. Bowel sounds are normal.      Palpations: Abdomen is soft.      Hernia: There is no hernia in the left inguinal area or right inguinal area.   Genitourinary:     Penis: Normal.       Testes:         Right: Tenderness " and swelling present.   Musculoskeletal:         General: Normal range of motion.   Lymphadenopathy:      Lower Body: No right inguinal adenopathy. No left inguinal adenopathy.   Skin:     General: Skin is warm and dry.   Neurological:      General: No focal deficit present.      Mental Status: He is alert and oriented to person, place, and time. Mental status is at baseline.   Psychiatric:         Mood and Affect: Mood normal.         Behavior: Behavior normal.         Thought Content: Thought content normal.         Judgment: Judgment normal.         Assessment/Plan   Problem List Items Addressed This Visit             ICD-10-CM    Asthma  Stable  No SOB, no wheezing  Has PRN albuterol inhaler J45.909    Pain in scrotum - Primary N50.82    Persisting  right sided testicular pain; Going on for 2 weeks now; Some radiation of pain into groin; Pain waxes and wanes; Hurts with movement and sitting  No pain with urination or issues with urination, no fevers, no chills; No frequency, urgency, dysuria, discharge  Hx of vas differens infection  Lab Results   Component Value Date    PSA 0.66 09/10/2024     Relevant Orders    US scrotum w doppler- STAT (now)     Follow up if new, persisting, or worsening symptoms.

## 2025-03-20 ENCOUNTER — APPOINTMENT (OUTPATIENT)
Dept: UROLOGY | Facility: CLINIC | Age: 45
End: 2025-03-20
Payer: COMMERCIAL

## 2025-03-20 VITALS — SYSTOLIC BLOOD PRESSURE: 117 MMHG | HEART RATE: 92 BPM | DIASTOLIC BLOOD PRESSURE: 70 MMHG

## 2025-03-20 DIAGNOSIS — I86.1 RIGHT VARICOCELE: ICD-10-CM

## 2025-03-20 DIAGNOSIS — N45.1 EPIDIDYMITIS: Primary | ICD-10-CM

## 2025-03-20 PROCEDURE — G2211 COMPLEX E/M VISIT ADD ON: HCPCS | Performed by: NURSE PRACTITIONER

## 2025-03-20 PROCEDURE — 99203 OFFICE O/P NEW LOW 30 MIN: CPT | Performed by: NURSE PRACTITIONER

## 2025-03-20 PROCEDURE — 3078F DIAST BP <80 MM HG: CPT | Performed by: NURSE PRACTITIONER

## 2025-03-20 PROCEDURE — 3074F SYST BP LT 130 MM HG: CPT | Performed by: NURSE PRACTITIONER

## 2025-03-20 RX ORDER — LEVOFLOXACIN 500 MG/1
500 TABLET, FILM COATED ORAL DAILY
Qty: 10 TABLET | Refills: 0 | Status: SHIPPED | OUTPATIENT
Start: 2025-03-20 | End: 2025-03-30

## 2025-03-20 NOTE — LETTER
March 20, 2025     Francy Nesbitt, APRN-CNP  [address] [place], [facility] [address]  [facility] [address]    Patient: Joselito Stanley   YOB: 1980   Date of Visit: 3/20/2025       Dear Dr. Francy Nesbitt, APRN-CNP:    Thank you for referring Joselito Stanley to me for evaluation. Below are my notes for this consultation.  If you have questions, please do not hesitate to call me. I look forward to following your patient along with you.       Sincerely,     Susie Pickett, APRN-CNP      CC: No Recipients  ______________________________________________________________________________________    UROLOGIC INITIAL EVALUATION     PROBLEM LIST:  1. Epididymitis  levoFLOXacin (Levaquin) 500 mg tablet      2. Right varicocele  Referral to Urology           HISTORY OF PRESENT ILLNESS:   Joselito Stanley is a 45 y.o. with HTN, psoriasis  Kindly referred PCP for evaluation and management of R testicular pain  Seen unaccompanied  Reports sudden onset of severe pain last week  Uncertain if acetaminophen, NSAID helped  Pain better but still there  Not requiring pain meds  Recalls remote hx vas deferens infection  Feels somewhat similar    Going to [facility] Monday with family  [employer] [facility]    PAST MEDICAL HISTORY:  Past Medical History:   Diagnosis Date   • Dehydration 09/07/2023   • Mixed anxiety depressive disorder 03/14/2025   • Personal history of other diseases of the circulatory system     History of hypertension   • Personal history of other diseases of the musculoskeletal system and connective tissue 07/21/2020    History of low back pain   • Personal history of other endocrine, nutritional and metabolic disease     History of hyperlipidemia   • Viral upper respiratory infection 09/07/2023   • Viral upper respiratory tract infection 03/14/2025       PAST SURGICAL HISTORY:  No past surgical history on file.     ALLERGIES:   No Known Allergies     MEDICATIONS:   Current Outpatient Medications on  File Prior to Visit   Medication Sig Dispense Refill   • albuterol 90 mcg/actuation inhaler Inhale 2 puffs every 4 hours if needed for wheezing. 18 g 3   • atorvastatin (Lipitor) 20 mg tablet Take 1 tablet (20 mg) by mouth once daily at bedtime. 90 tablet 3   • valsartan (Diovan) 40 mg tablet Take 1 tablet (40 mg) by mouth once daily. 90 tablet 3   • [DISCONTINUED] diclofenac sodium (Voltaren) 1 % gel gel Apply 1 Application topically 4 times a day.       No current facility-administered medications on file prior to visit.        SOCIAL HISTORY:  Patient  reports that he has never smoked. He has never used smokeless tobacco. He reports current alcohol use. He reports that he does not use drugs.   Social History     Socioeconomic History   • Marital status:      Spouse name: Not on file   • Number of children: Not on file   • Years of education: Not on file   • Highest education level: Not on file   Occupational History   • Not on file   Tobacco Use   • Smoking status: Never   • Smokeless tobacco: Never   Substance and Sexual Activity   • Alcohol use: Yes     Comment: occassionlly   • Drug use: Never   • Sexual activity: Not on file   Other Topics Concern   • Not on file   Social History Narrative   • Not on file     Social Drivers of Health     Financial Resource Strain: Not on file   Food Insecurity: Not on file   Transportation Needs: Not on file   Physical Activity: Not on file   Stress: Not on file   Social Connections: Not on file   Intimate Partner Violence: Not on file   Housing Stability: Not on file       FAMILY HISTORY:  Family History   Problem Relation Name Age of Onset   • Colon cancer Maternal Grandfather  53       REVIEW OF SYSTEMS:  All systems reviewed, pertinent negatives as noted in HPI.     PHYSICAL EXAM:  Visit Vitals  /70   Pulse 92     Constitutional: Well-developed and well-nourished. No distress.    Head: Normocephalic and atraumatic.    Neck: Normal range of motion.      Pulmonary/Chest: Effort normal. No respiratory distress.   Abdominal: Non-distended.  : See below.  Integumentary: No rash or lesions visualized.  Musculoskeletal: Normal range of motion.    Neurological: Alert, grossly intact.  Psychiatric: Normal mood and affect. Thought content normal.      LABORATORY REVIEW:   Lab Results   Component Value Date    BUN 8 09/10/2024    CREATININE 0.93 09/10/2024    EGFR >90 09/10/2024     09/10/2024    K 4.1 09/10/2024     09/10/2024    CO2 26 09/10/2024    CALCIUM 8.9 09/10/2024      Lab Results   Component Value Date    WBC 5.4 09/10/2024    RBC 5.38 09/10/2024    HGB 15.0 09/10/2024    HCT 44.7 09/10/2024    MCV 83 09/10/2024    MCH 27.9 09/10/2024    MCHC 33.6 09/10/2024    RDW 12.6 09/10/2024     09/10/2024        Lab Results   Component Value Date    PSA 0.66 09/10/2024             Assessment:      1. Epididymitis  levoFLOXacin (Levaquin) 500 mg tablet      2. Right varicocele  Referral to Urology          Joselito Stanley is a 45 y.o. with acute onset of severe R testicular pain  Scrotal US 3/14/25 showed B small-moderate hydrocele, R varicocele  I personally reviewed & interpreted available labs and imaging  Pain now more tolerable, not taking pain medication    B descended testicles on exam  L testicle with small hydrocele  R testicle with minimal varicocele, tenderness at apex     Discussed epididymitis vs pelvic floor pain  Agreeable to plan as below     Plan:   Recommend 2-3 days of acetaminophen 500 mg & ibuprofen 300 mg po TID, supportive underwear  If no improvement or worse, start levofloxacin 500 mg po daily x 10 days for epididymitis  If pain persists, consider referral to pelvic floor PT  RTC prm, encouraged to contact us in the interim with any questions, concerns

## 2025-03-20 NOTE — PROGRESS NOTES
UROLOGIC INITIAL EVALUATION     PROBLEM LIST:  1. Epididymitis  levoFLOXacin (Levaquin) 500 mg tablet      2. Right varicocele  Referral to Urology           HISTORY OF PRESENT ILLNESS:   Joselito Stanley is a 45 y.o. with HTN, psoriasis  Kindly referred PCP for evaluation and management of R testicular pain  Seen unaccompanied  Reports sudden onset of severe pain last week  Uncertain if acetaminophen, NSAID helped  Pain better but still there  Not requiring pain meds  Recalls remote hx vas deferens infection  Feels somewhat similar    Going to [facility] Monday with family  [facility]    PAST MEDICAL HISTORY:  Past Medical History:   Diagnosis Date    Dehydration 09/07/2023    Mixed anxiety depressive disorder 03/14/2025    Personal history of other diseases of the circulatory system     History of hypertension    Personal history of other diseases of the musculoskeletal system and connective tissue 07/21/2020    History of low back pain    Personal history of other endocrine, nutritional and metabolic disease     History of hyperlipidemia    Viral upper respiratory infection 09/07/2023    Viral upper respiratory tract infection 03/14/2025       PAST SURGICAL HISTORY:  No past surgical history on file.     ALLERGIES:   No Known Allergies     MEDICATIONS:   Current Outpatient Medications on File Prior to Visit   Medication Sig Dispense Refill    albuterol 90 mcg/actuation inhaler Inhale 2 puffs every 4 hours if needed for wheezing. 18 g 3    atorvastatin (Lipitor) 20 mg tablet Take 1 tablet (20 mg) by mouth once daily at bedtime. 90 tablet 3    valsartan (Diovan) 40 mg tablet Take 1 tablet (40 mg) by mouth once daily. 90 tablet 3    [DISCONTINUED] diclofenac sodium (Voltaren) 1 % gel gel Apply 1 Application topically 4 times a day.       No current facility-administered medications on file prior to visit.        SOCIAL HISTORY:  Patient  reports that he has never smoked. He has never used smokeless tobacco. He  reports current alcohol use. He reports that he does not use drugs.   Social History     Socioeconomic History    Marital status:      Spouse name: Not on file    Number of children: Not on file    Years of education: Not on file    Highest education level: Not on file   Occupational History    Not on file   Tobacco Use    Smoking status: Never    Smokeless tobacco: Never   Substance and Sexual Activity    Alcohol use: Yes     Comment: occassionlly    Drug use: Never    Sexual activity: Not on file   Other Topics Concern    Not on file   Social History Narrative    Not on file     Social Drivers of Health     Financial Resource Strain: Not on file   Food Insecurity: Not on file   Transportation Needs: Not on file   Physical Activity: Not on file   Stress: Not on file   Social Connections: Not on file   Intimate Partner Violence: Not on file   Housing Stability: Not on file       FAMILY HISTORY:  Family History   Problem Relation Name Age of Onset    Colon cancer Maternal Grandfather  53       REVIEW OF SYSTEMS:  All systems reviewed, pertinent negatives as noted in HPI.     PHYSICAL EXAM:  Visit Vitals  /70   Pulse 92     Constitutional: Well-developed and well-nourished. No distress.    Head: Normocephalic and atraumatic.    Neck: Normal range of motion.     Pulmonary/Chest: Effort normal. No respiratory distress.   Abdominal: Non-distended.  : See below.  Integumentary: No rash or lesions visualized.  Musculoskeletal: Normal range of motion.    Neurological: Alert, grossly intact.  Psychiatric: Normal mood and affect. Thought content normal.      LABORATORY REVIEW:   Lab Results   Component Value Date    BUN 8 09/10/2024    CREATININE 0.93 09/10/2024    EGFR >90 09/10/2024     09/10/2024    K 4.1 09/10/2024     09/10/2024    CO2 26 09/10/2024    CALCIUM 8.9 09/10/2024      Lab Results   Component Value Date    WBC 5.4 09/10/2024    RBC 5.38 09/10/2024    HGB 15.0 09/10/2024    HCT 44.7  09/10/2024    MCV 83 09/10/2024    MCH 27.9 09/10/2024    MCHC 33.6 09/10/2024    RDW 12.6 09/10/2024     09/10/2024        Lab Results   Component Value Date    PSA 0.66 09/10/2024             Assessment:      1. Epididymitis  levoFLOXacin (Levaquin) 500 mg tablet      2. Right varicocele  Referral to Urology          Joselito Stanley is a 45 y.o. with acute onset of severe R testicular pain  Scrotal US 3/14/25 showed B small-moderate hydrocele, R varicocele  I personally reviewed & interpreted available labs and imaging  Pain now more tolerable, not taking pain medication    B descended testicles on exam  L testicle with small hydrocele  R testicle with minimal varicocele, tenderness at apex     Discussed epididymitis vs pelvic floor pain  Agreeable to plan as below     Plan:   Recommend 2-3 days of acetaminophen 500 mg & ibuprofen 300 mg po TID, supportive underwear  If no improvement or worse, start levofloxacin 500 mg po daily x 10 days for epididymitis  If pain persists, consider referral to pelvic floor PT  RTC prm, encouraged to contact us in the interim with any questions, concerns

## 2025-04-10 ENCOUNTER — TELEPHONE (OUTPATIENT)
Dept: GASTROENTEROLOGY | Facility: HOSPITAL | Age: 45
End: 2025-04-10
Payer: COMMERCIAL

## 2025-04-10 DIAGNOSIS — Z12.11 COLON CANCER SCREENING: Primary | ICD-10-CM

## 2025-04-10 RX ORDER — POLYETHYLENE GLYCOL 3350, SODIUM SULFATE ANHYDROUS, SODIUM BICARBONATE, SODIUM CHLORIDE, POTASSIUM CHLORIDE 236; 22.74; 6.74; 5.86; 2.97 G/4L; G/4L; G/4L; G/4L; G/4L
4 POWDER, FOR SOLUTION ORAL ONCE
Qty: 4000 ML | Refills: 0 | Status: SHIPPED | OUTPATIENT
Start: 2025-04-10 | End: 2025-04-10

## 2025-04-10 NOTE — TELEPHONE ENCOUNTER
----- Message from James THAYER sent at 4/9/2025  3:02 PM EDT -----  Regarding: golytely prep rx  Please place an order for the patient's bowel prep to their preferred pharmacy.  Procedure scheduled for 8/21/25.  Thanks.     [id number] - [place], [place] - [address] [facility]   Phone: [phone]  Fax: [phone]

## 2025-08-21 ENCOUNTER — HOSPITAL ENCOUNTER (OUTPATIENT)
Dept: GASTROENTEROLOGY | Facility: HOSPITAL | Age: 45
Discharge: HOME | End: 2025-08-21
Payer: COMMERCIAL

## 2025-08-21 ENCOUNTER — ANESTHESIA (OUTPATIENT)
Dept: GASTROENTEROLOGY | Facility: HOSPITAL | Age: 45
End: 2025-08-21
Payer: COMMERCIAL

## 2025-08-21 ENCOUNTER — ANESTHESIA EVENT (OUTPATIENT)
Dept: GASTROENTEROLOGY | Facility: HOSPITAL | Age: 45
End: 2025-08-21
Payer: COMMERCIAL

## 2025-08-21 VITALS
RESPIRATION RATE: 16 BRPM | WEIGHT: 220 LBS | DIASTOLIC BLOOD PRESSURE: 68 MMHG | HEART RATE: 86 BPM | BODY MASS INDEX: 35.36 KG/M2 | HEIGHT: 66 IN | OXYGEN SATURATION: 96 % | SYSTOLIC BLOOD PRESSURE: 110 MMHG | TEMPERATURE: 97.2 F

## 2025-08-21 DIAGNOSIS — Z12.12 ENCOUNTER FOR COLORECTAL CANCER SCREENING: ICD-10-CM

## 2025-08-21 DIAGNOSIS — Z12.11 ENCOUNTER FOR COLORECTAL CANCER SCREENING: ICD-10-CM

## 2025-08-21 PROCEDURE — 7100000009 HC PHASE TWO TIME - INITIAL BASE CHARGE

## 2025-08-21 PROCEDURE — 2500000004 HC RX 250 GENERAL PHARMACY W/ HCPCS (ALT 636 FOR OP/ED)

## 2025-08-21 PROCEDURE — 3700000001 HC GENERAL ANESTHESIA TIME - INITIAL BASE CHARGE

## 2025-08-21 PROCEDURE — 7100000010 HC PHASE TWO TIME - EACH INCREMENTAL 1 MINUTE

## 2025-08-21 PROCEDURE — 2720000007 HC OR 272 NO HCPCS

## 2025-08-21 PROCEDURE — 3700000002 HC GENERAL ANESTHESIA TIME - EACH INCREMENTAL 1 MINUTE

## 2025-08-21 RX ORDER — LIDOCAINE HCL/PF 100 MG/5ML
SYRINGE (ML) INTRAVENOUS AS NEEDED
Status: DISCONTINUED | OUTPATIENT
Start: 2025-08-21 | End: 2025-08-21

## 2025-08-21 RX ORDER — MIDAZOLAM HYDROCHLORIDE 2 MG/2ML
INJECTION, SOLUTION INTRAMUSCULAR; INTRAVENOUS AS NEEDED
Status: DISCONTINUED | OUTPATIENT
Start: 2025-08-21 | End: 2025-08-21

## 2025-08-21 RX ORDER — PROPOFOL 10 MG/ML
INJECTION, EMULSION INTRAVENOUS AS NEEDED
Status: DISCONTINUED | OUTPATIENT
Start: 2025-08-21 | End: 2025-08-21

## 2025-08-21 RX ORDER — CETIRIZINE HYDROCHLORIDE 10 MG/1
10 TABLET ORAL DAILY
COMMUNITY

## 2025-08-21 RX ADMIN — LIDOCAINE HYDROCHLORIDE 100 MG: 20 INJECTION INTRAVENOUS at 10:30

## 2025-08-21 RX ADMIN — SODIUM CHLORIDE, SODIUM LACTATE, POTASSIUM CHLORIDE, AND CALCIUM CHLORIDE: 600; 310; 30; 20 INJECTION, SOLUTION INTRAVENOUS at 10:26

## 2025-08-21 RX ADMIN — MIDAZOLAM HYDROCHLORIDE 2 MG: 2 INJECTION, SOLUTION INTRAMUSCULAR; INTRAVENOUS at 10:26

## 2025-08-21 RX ADMIN — PROPOFOL 60 MG: 10 INJECTION, EMULSION INTRAVENOUS at 10:30

## 2025-08-21 RX ADMIN — PROPOFOL 125 MCG/KG/MIN: 10 INJECTION, EMULSION INTRAVENOUS at 10:31

## 2025-08-21 ASSESSMENT — PAIN SCALES - GENERAL
PAINLEVEL_OUTOF10: 0 - NO PAIN

## 2025-08-21 ASSESSMENT — PAIN - FUNCTIONAL ASSESSMENT
PAIN_FUNCTIONAL_ASSESSMENT: 0-10

## 2025-08-29 LAB
LABORATORY COMMENT REPORT: NORMAL
PATH REPORT.FINAL DX SPEC: NORMAL
PATH REPORT.GROSS SPEC: NORMAL
PATH REPORT.TOTAL CANCER: NORMAL

## 2025-10-10 ENCOUNTER — APPOINTMENT (OUTPATIENT)
Dept: PRIMARY CARE | Facility: CLINIC | Age: 45
End: 2025-10-10
Payer: COMMERCIAL